# Patient Record
Sex: FEMALE | Race: WHITE | NOT HISPANIC OR LATINO | URBAN - METROPOLITAN AREA
[De-identification: names, ages, dates, MRNs, and addresses within clinical notes are randomized per-mention and may not be internally consistent; named-entity substitution may affect disease eponyms.]

---

## 2017-02-02 NOTE — PATIENT DISCUSSION
DRY EYE SYNDROME OU: RX ARTIFICIAL TEARS AS NEEDED TO INCREASE COMFORT OU. IF SYMPTOMS PERSIST CONSIDER PUNCTAL PLUGS.

## 2022-07-25 NOTE — PATIENT DISCUSSION
NO HOLES. NO TEARS. RETINAL DETACHMENT WARNINGS  DISCUSSED. FLOATERS AND FLASHES BROCHURE GIVEN. RETURN FOR FOLLOW-UP AS SCHEDULED.

## 2022-09-20 ENCOUNTER — ESTABLISHED PATIENT (OUTPATIENT)
Facility: LOCATION | Age: 47
End: 2022-09-20

## 2022-09-20 DIAGNOSIS — H52.223: ICD-10-CM

## 2022-09-20 DIAGNOSIS — H52.4: ICD-10-CM

## 2022-09-20 PROCEDURE — 92015 DETERMINE REFRACTIVE STATE: CPT

## 2022-09-20 PROCEDURE — 92012 INTRM OPH EXAM EST PATIENT: CPT

## 2022-09-20 ASSESSMENT — TONOMETRY
OD_IOP_MMHG: 16
OS_IOP_MMHG: 16

## 2022-09-20 ASSESSMENT — VISUAL ACUITY
OU_CC: 20/20
OD_CC: 20/20
OD_CC: J1
OU_CC: J1+
OS_CC: J1+
OS_CC: 20/20

## 2022-12-20 NOTE — PATIENT DISCUSSION
Last seen 11/14/22  Next visit 3/30/23  Please advise regarding refill.       Name from pharmacy: MELOXICAM 15MG TABLETS         Will file in chart as: meloxicam (MOBIC) 15 MG tablet    Sig: TAKE 1 TABLET BY MOUTH DAILY    Disp:  90 tablet    Refills:  0 (Pharmacy requested: Not specified)    Start: 12/20/2022    Class: Eprescribe    Non-formulary    Last ordered: 3 months ago by Trish Lord MD Last refill: 8/29/2022    Rx #: [id number]    NSAID Refill Protocol - 12 Month Protocol Failed 12/20/2022 12:40 PM   Protocol Details  HGB greater than 10 and HCT greater than 30 in past 9 months looking at last value    Patient is less than 69 years old    Seen by prescribing provider or same department within the last 12 months or has a future appt in 3 months - IF FAILED PLEASE LOOK AT CHART REVIEW FOR LAST VISIT AND PROCEED ACCORDINGLY    eGFR greater than 29 within last 12 months looking at last value    AST less than 55 in past 9 months looking at last value    Normal Potassium within last 9 months looking at last value    ALT less than 90 in past 9 months looking at last value    Medication (including dose and sig) on current meds list    Request is NOT for Ketorolac      To be filled at: [id number] - [place], [place] - [id number] AT [facility]        Lens Disposal Schedule:2 Weeks

## 2023-11-14 ENCOUNTER — ESTABLISHED PATIENT (OUTPATIENT)
Facility: LOCATION | Age: 48
End: 2023-11-14

## 2023-11-14 DIAGNOSIS — H52.223: ICD-10-CM

## 2023-11-14 DIAGNOSIS — H52.4: ICD-10-CM

## 2023-11-14 DIAGNOSIS — H16.223: ICD-10-CM

## 2023-11-14 PROCEDURE — 92014 COMPRE OPH EXAM EST PT 1/>: CPT

## 2023-11-14 PROCEDURE — 92015KEC REFRACTION KEC

## 2023-11-14 ASSESSMENT — TONOMETRY
OS_IOP_MMHG: 14
OD_IOP_MMHG: 13

## 2023-11-14 ASSESSMENT — VISUAL ACUITY
OS_CC: J3
OS_CC: 20/25
OD_CC: J3
OD_CC: 20/25

## 2023-12-11 ENCOUNTER — ESTABLISHED PATIENT (OUTPATIENT)
Facility: LOCATION | Age: 48
End: 2023-12-11

## 2023-12-11 DIAGNOSIS — H16.223: ICD-10-CM

## 2023-12-11 DIAGNOSIS — H52.223: ICD-10-CM

## 2023-12-11 DIAGNOSIS — G43.B1: ICD-10-CM

## 2023-12-11 DIAGNOSIS — H52.4: ICD-10-CM

## 2023-12-11 PROCEDURE — 92015KEC REFRACTION KEC

## 2023-12-11 PROCEDURE — 92014 COMPRE OPH EXAM EST PT 1/>: CPT

## 2023-12-11 ASSESSMENT — TONOMETRY
OS_IOP_MMHG: 15
OD_IOP_MMHG: 17

## 2023-12-11 ASSESSMENT — VISUAL ACUITY
OS_CC: J1+
OS_CC: 20/20
OD_CC: 20/20
OD_CC: J2

## 2024-02-08 ENCOUNTER — ESTABLISHED PATIENT (OUTPATIENT)
Facility: LOCATION | Age: 49
End: 2024-02-08

## 2024-02-08 DIAGNOSIS — H52.223: ICD-10-CM

## 2024-02-08 DIAGNOSIS — H52.4: ICD-10-CM

## 2024-02-08 PROCEDURE — 92015 DETERMINE REFRACTIVE STATE: CPT

## 2024-02-08 ASSESSMENT — VISUAL ACUITY
OD_CC: 20/20-1
OS_CC: J1+
OD_CC: J1+
OS_CC: 20/20

## 2025-03-21 ENCOUNTER — COMPREHENSIVE EXAM (OUTPATIENT)
Age: 50
End: 2025-03-21

## 2025-03-21 DIAGNOSIS — H52.4: ICD-10-CM

## 2025-03-21 PROCEDURE — 92014 COMPRE OPH EXAM EST PT 1/>: CPT

## 2025-03-21 PROCEDURE — 92015KEC REFRACTION KEC
